# Patient Record
Sex: FEMALE | Race: WHITE | NOT HISPANIC OR LATINO | Employment: OTHER | URBAN - METROPOLITAN AREA
[De-identification: names, ages, dates, MRNs, and addresses within clinical notes are randomized per-mention and may not be internally consistent; named-entity substitution may affect disease eponyms.]

---

## 2017-03-17 ENCOUNTER — PREPPED CHART (OUTPATIENT)
Dept: URBAN - METROPOLITAN AREA CLINIC 39 | Facility: CLINIC | Age: 77
End: 2017-03-17

## 2018-06-07 ENCOUNTER — ESTABLISHED COMPREHENSIVE EXAM (OUTPATIENT)
Dept: URBAN - METROPOLITAN AREA CLINIC 39 | Facility: CLINIC | Age: 78
End: 2018-06-07

## 2018-06-07 DIAGNOSIS — H04.123: ICD-10-CM

## 2018-06-07 DIAGNOSIS — H35.363: ICD-10-CM

## 2018-06-07 DIAGNOSIS — H43.393: ICD-10-CM

## 2018-06-07 PROCEDURE — 92134 CPTRZ OPH DX IMG PST SGM RTA: CPT

## 2018-06-07 PROCEDURE — G8427 DOCREV CUR MEDS BY ELIG CLIN: HCPCS

## 2018-06-07 PROCEDURE — 1036F TOBACCO NON-USER: CPT

## 2018-06-07 PROCEDURE — 92015 DETERMINE REFRACTIVE STATE: CPT

## 2018-06-07 PROCEDURE — 92014 COMPRE OPH EXAM EST PT 1/>: CPT

## 2018-06-07 PROCEDURE — G8785 BP SCRN NO PERF AT INTERVAL: HCPCS

## 2018-06-07 ASSESSMENT — VISUAL ACUITY
OS_SC: 20/30+2
OS_SC: J1
OD_SC: 20/25
OD_SC: J1

## 2018-06-07 ASSESSMENT — TONOMETRY
OD_IOP_MMHG: 12
OS_IOP_MMHG: 12

## 2019-08-07 ENCOUNTER — ESTABLISHED COMPREHENSIVE EXAM (OUTPATIENT)
Dept: URBAN - METROPOLITAN AREA CLINIC 39 | Facility: CLINIC | Age: 79
End: 2019-08-07

## 2019-08-07 DIAGNOSIS — H43.813: ICD-10-CM

## 2019-08-07 DIAGNOSIS — H35.3131: ICD-10-CM

## 2019-08-07 DIAGNOSIS — H04.123: ICD-10-CM

## 2019-08-07 PROCEDURE — 92134 CPTRZ OPH DX IMG PST SGM RTA: CPT

## 2019-08-07 PROCEDURE — 92014 COMPRE OPH EXAM EST PT 1/>: CPT

## 2019-08-07 ASSESSMENT — VISUAL ACUITY
OS_SC: J2
OU_SC: 20/25+2
OU_SC: J1
OS_SC: 20/25
OD_SC: J2
OD_SC: 20/25

## 2019-08-07 ASSESSMENT — TONOMETRY
OS_IOP_MMHG: 15
OD_IOP_MMHG: 14

## 2020-12-21 NOTE — PATIENT DISCUSSION
- Follow up in 1 year for [facility] SERVICES OF [facility]

## 2021-08-12 ENCOUNTER — ESTABLISHED COMPREHENSIVE EXAM (OUTPATIENT)
Dept: URBAN - METROPOLITAN AREA CLINIC 39 | Facility: CLINIC | Age: 81
End: 2021-08-12

## 2021-08-12 DIAGNOSIS — H52.12: ICD-10-CM

## 2021-08-12 DIAGNOSIS — H35.3131: ICD-10-CM

## 2021-08-12 DIAGNOSIS — H43.813: ICD-10-CM

## 2021-08-12 DIAGNOSIS — H52.201: ICD-10-CM

## 2021-08-12 DIAGNOSIS — H52.01: ICD-10-CM

## 2021-08-12 DIAGNOSIS — H04.123: ICD-10-CM

## 2021-08-12 PROCEDURE — 92015 DETERMINE REFRACTIVE STATE: CPT

## 2021-08-12 PROCEDURE — 92250 FUNDUS PHOTOGRAPHY W/I&R: CPT

## 2021-08-12 PROCEDURE — 92014 COMPRE OPH EXAM EST PT 1/>: CPT

## 2021-08-12 ASSESSMENT — VISUAL ACUITY
OS_SC: 20/30+2
OS_SC: J2
OD_SC: 20/40+2
OU_SC: 20/25
OD_SC: J2

## 2021-08-12 ASSESSMENT — TONOMETRY
OS_IOP_MMHG: 14
OD_IOP_MMHG: 14

## 2022-08-23 ENCOUNTER — PREPPED CHART (OUTPATIENT)
Dept: URBAN - METROPOLITAN AREA CLINIC 39 | Facility: CLINIC | Age: 82
End: 2022-08-23

## 2023-04-14 ENCOUNTER — COMPREHENSIVE EXAM (OUTPATIENT)
Dept: URBAN - METROPOLITAN AREA CLINIC 39 | Facility: CLINIC | Age: 83
End: 2023-04-14

## 2023-04-14 DIAGNOSIS — H04.123: ICD-10-CM

## 2023-04-14 DIAGNOSIS — H52.01: ICD-10-CM

## 2023-04-14 DIAGNOSIS — H35.3131: ICD-10-CM

## 2023-04-14 DIAGNOSIS — H52.201: ICD-10-CM

## 2023-04-14 DIAGNOSIS — H52.12: ICD-10-CM

## 2023-04-14 DIAGNOSIS — H43.813: ICD-10-CM

## 2023-04-14 PROCEDURE — 92015 DETERMINE REFRACTIVE STATE: CPT

## 2023-04-14 PROCEDURE — 92250 FUNDUS PHOTOGRAPHY W/I&R: CPT

## 2023-04-14 PROCEDURE — 92014 COMPRE OPH EXAM EST PT 1/>: CPT

## 2023-04-14 ASSESSMENT — VISUAL ACUITY
OS_SC: J4
OS_SC: 20/20-2
OD_SC: 20/25
OU_SC: 20/20-2
OU_SC: J4
OD_SC: J6

## 2023-04-14 ASSESSMENT — TONOMETRY
OS_IOP_MMHG: 14
OD_IOP_MMHG: 14

## 2024-04-24 ENCOUNTER — COMPREHENSIVE EXAM (OUTPATIENT)
Dept: URBAN - METROPOLITAN AREA CLINIC 39 | Facility: CLINIC | Age: 84
End: 2024-04-24

## 2024-04-24 DIAGNOSIS — H52.12: ICD-10-CM

## 2024-04-24 DIAGNOSIS — H04.123: ICD-10-CM

## 2024-04-24 DIAGNOSIS — H35.3131: ICD-10-CM

## 2024-04-24 DIAGNOSIS — H52.201: ICD-10-CM

## 2024-04-24 DIAGNOSIS — H52.01: ICD-10-CM

## 2024-04-24 DIAGNOSIS — H43.813: ICD-10-CM

## 2024-04-24 PROCEDURE — 92250 FUNDUS PHOTOGRAPHY W/I&R: CPT | Mod: 25

## 2024-04-24 PROCEDURE — 92015 DETERMINE REFRACTIVE STATE: CPT

## 2024-04-24 PROCEDURE — 92014 COMPRE OPH EXAM EST PT 1/>: CPT

## 2024-04-24 ASSESSMENT — VISUAL ACUITY
OS_SC: J5
OD_SC: 20/40
OD_SC: J3
OS_SC: 20/25

## 2024-04-24 ASSESSMENT — TONOMETRY
OS_IOP_MMHG: 12
OD_IOP_MMHG: 12